# Patient Record
Sex: FEMALE | Race: WHITE | NOT HISPANIC OR LATINO
[De-identification: names, ages, dates, MRNs, and addresses within clinical notes are randomized per-mention and may not be internally consistent; named-entity substitution may affect disease eponyms.]

---

## 2017-04-01 PROBLEM — Z00.00 ENCOUNTER FOR PREVENTIVE HEALTH EXAMINATION: Status: ACTIVE | Noted: 2017-04-01

## 2019-10-18 ENCOUNTER — APPOINTMENT (OUTPATIENT)
Age: 44
End: 2019-10-18
Payer: COMMERCIAL

## 2019-10-18 PROCEDURE — 74177 CT ABD & PELVIS W/CONTRAST: CPT

## 2019-10-18 PROCEDURE — Q9967B: CUSTOM

## 2020-10-15 ENCOUNTER — APPOINTMENT (OUTPATIENT)
Dept: OBGYN | Facility: CLINIC | Age: 45
End: 2020-10-15

## 2020-11-11 ENCOUNTER — EMERGENCY (EMERGENCY)
Facility: HOSPITAL | Age: 45
LOS: 1 days | End: 2020-11-11
Admitting: EMERGENCY MEDICINE
Payer: COMMERCIAL

## 2020-11-11 PROCEDURE — 93010 ELECTROCARDIOGRAM REPORT: CPT

## 2020-11-11 PROCEDURE — 74177 CT ABD & PELVIS W/CONTRAST: CPT | Mod: 26

## 2020-11-11 PROCEDURE — 99285 EMERGENCY DEPT VISIT HI MDM: CPT

## 2020-11-11 PROCEDURE — 76705 ECHO EXAM OF ABDOMEN: CPT | Mod: 26

## 2022-03-15 ENCOUNTER — TRANSCRIPTION ENCOUNTER (OUTPATIENT)
Age: 47
End: 2022-03-15

## 2022-04-11 ENCOUNTER — TRANSCRIPTION ENCOUNTER (OUTPATIENT)
Age: 47
End: 2022-04-11

## 2022-04-14 ENCOUNTER — TRANSCRIPTION ENCOUNTER (OUTPATIENT)
Age: 47
End: 2022-04-14

## 2022-12-12 ENCOUNTER — APPOINTMENT (OUTPATIENT)
Dept: ORTHOPEDIC SURGERY | Facility: CLINIC | Age: 47
End: 2022-12-12

## 2022-12-12 DIAGNOSIS — D68.9 COAGULATION DEFECT, UNSPECIFIED: ICD-10-CM

## 2022-12-12 DIAGNOSIS — Z78.9 OTHER SPECIFIED HEALTH STATUS: ICD-10-CM

## 2022-12-12 PROCEDURE — 72100 X-RAY EXAM L-S SPINE 2/3 VWS: CPT

## 2022-12-12 PROCEDURE — 99214 OFFICE O/P EST MOD 30 MIN: CPT

## 2022-12-12 RX ORDER — PANCRELIPASE 36000; 180000; 114000 [USP'U]/1; [USP'U]/1; [USP'U]/1
36000-114000 CAPSULE, DELAYED RELEASE PELLETS ORAL
Qty: 240 | Refills: 0 | Status: ACTIVE | COMMUNITY
Start: 2022-11-29

## 2022-12-12 NOTE — HISTORY OF PRESENT ILLNESS
[de-identified] : Patient denies any injury, she reports pain and stiffness that starts in the back and radiates into BL upper thighs and the LLE to the foot. She attributes the pain to running, which she is able to perform.

## 2022-12-12 NOTE — PHYSICAL EXAM
[NL (90)] : forward flexion 90 degrees [NL (30)] : right lateral rotation 30 degrees [NL (45)] : extension 45 degrees [NL (40)] : right lateral bending 40 degrees [2___] : left dorsiflexors 2[unfilled]/5 [3___] : right dorsiflexors 3[unfilled]/5 [4___] : right extensor hallicus longus 4[unfilled]/5 [] : patient ambulates without assistive device [Straightening consistent with spasm] : Straightening consistent with spasm

## 2022-12-29 ENCOUNTER — FORM ENCOUNTER (OUTPATIENT)
Age: 47
End: 2022-12-29

## 2023-01-09 ENCOUNTER — APPOINTMENT (OUTPATIENT)
Dept: ORTHOPEDIC SURGERY | Facility: CLINIC | Age: 48
End: 2023-01-09
Payer: COMMERCIAL

## 2023-01-09 DIAGNOSIS — M51.27 OTHER INTERVERTEBRAL DISC DISPLACEMENT, LUMBOSACRAL REGION: ICD-10-CM

## 2023-01-09 PROCEDURE — 99214 OFFICE O/P EST MOD 30 MIN: CPT

## 2023-01-09 NOTE — DATA REVIEWED
[MRI] : MRI [Lumbar Spine] : lumbar spine [FreeTextEntry1] : L3-4 lateral disc hernation with LT L3 and RT L4 nerve root impression. L4-5 broad disc herniation with LT L5 nerve root impression

## 2023-01-09 NOTE — PHYSICAL EXAM
[NL (90)] : forward flexion 90 degrees [NL (30)] : right lateral rotation 30 degrees [NL (45)] : extension 45 degrees [NL (40)] : right lateral bending 40 degrees [2___] : left dorsiflexors 2[unfilled]/5 [3___] : right dorsiflexors 3[unfilled]/5 [4___] : right extensor hallicus longus 4[unfilled]/5 [Straightening consistent with spasm] : Straightening consistent with spasm [] : non-antalgic

## 2023-01-26 ENCOUNTER — APPOINTMENT (OUTPATIENT)
Dept: ORTHOPEDIC SURGERY | Facility: CLINIC | Age: 48
End: 2023-01-26
Payer: COMMERCIAL

## 2023-01-26 PROCEDURE — 99215 OFFICE O/P EST HI 40 MIN: CPT

## 2023-01-26 PROCEDURE — 73502 X-RAY EXAM HIP UNI 2-3 VIEWS: CPT

## 2023-01-26 RX ORDER — METHYLPREDNISOLONE 4 MG/1
4 TABLET ORAL
Qty: 1 | Refills: 0 | Status: ACTIVE | COMMUNITY
Start: 2023-01-26 | End: 1900-01-01

## 2023-01-26 NOTE — HISTORY OF PRESENT ILLNESS
[Lower back] : lower back [5] : 5 [8] : 8 [Shooting] : shooting [Constant] : constant [Leisure] : leisure [Sleep] : sleep [Physical therapy] : physical therapy [Sitting] : sitting [Lying in bed] : lying in bed [Full time] : Work status: full time [de-identified] : patient presents today with low back pain NKI. She states she has been running for a long time and started to feel back pain 3 months ago. She also c/o right hip pain. She saw Dr Tesfaye on 12/12/22 and got Xrays. She got an MRI of her lumbar spine at [facility] on 12/30/22. Dr Tesfaye told her she also has limited mobility in her left foot. She has been going to physical therapy since December with some relief. She feels numbness and tingling in both lower extremities, left more than right. 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

## 2023-01-26 NOTE — ASSESSMENT
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 \par \par Patient will begin Medrol.  Patient advised not to take any NSAIDs while taking the steroids. \par \par Patient given prescription for MRI, follow up after study is completed to discuss results. \par \par Continue PT \par \par Recommend: - NSAID - Heating pad - Muscle relaxer - Core strengthening exercise - Hamstring stretching exercise Patient is given back rehabilitation exercise book. \par \par Recommend: - rest - ice - compression - elevation \par \par

## 2023-01-26 NOTE — DATA REVIEWED
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

## 2023-01-26 NOTE — PHYSICAL EXAM
[Right] : right hip [5___] : adduction 5[unfilled]/5 [] : positive impingement maneuvers [2+] : posterior tibialis pulse: 2+ [de-identified] : external rotation 30 degrees [TWNoteComboBox6] : internal rotation 10 degrees

## 2023-01-29 ENCOUNTER — FORM ENCOUNTER (OUTPATIENT)
Age: 48
End: 2023-01-29

## 2023-01-30 ENCOUNTER — APPOINTMENT (OUTPATIENT)
Dept: MRI IMAGING | Facility: CLINIC | Age: 48
End: 2023-01-30
Payer: COMMERCIAL

## 2023-01-30 PROCEDURE — 73721 MRI JNT OF LWR EXTRE W/O DYE: CPT | Mod: RT

## 2023-02-06 ENCOUNTER — APPOINTMENT (OUTPATIENT)
Dept: ORTHOPEDIC SURGERY | Facility: CLINIC | Age: 48
End: 2023-02-06
Payer: COMMERCIAL

## 2023-02-06 DIAGNOSIS — M47.817 SPONDYLOSIS W/OUT MYELOPATHY OR RADICULOPATHY, LUMBOSACRAL REGION: ICD-10-CM

## 2023-02-06 DIAGNOSIS — M25.851 OTHER SPECIFIED JOINT DISORDERS, RIGHT HIP: ICD-10-CM

## 2023-02-06 DIAGNOSIS — M70.61 TROCHANTERIC BURSITIS, RIGHT HIP: ICD-10-CM

## 2023-02-06 DIAGNOSIS — M51.26 OTHER INTERVERTEBRAL DISC DISPLACEMENT, LUMBAR REGION: ICD-10-CM

## 2023-02-06 DIAGNOSIS — M54.16 RADICULOPATHY, LUMBAR REGION: ICD-10-CM

## 2023-02-06 DIAGNOSIS — M51.37 OTHER INTERVERTEBRAL DISC DEGENERATION, LUMBOSACRAL REGION: ICD-10-CM

## 2023-02-06 DIAGNOSIS — M51.36 OTHER INTERVERTEBRAL DISC DEGENERATION, LUMBAR REGION: ICD-10-CM

## 2023-02-06 DIAGNOSIS — M16.11 UNILATERAL PRIMARY OSTEOARTHRITIS, RIGHT HIP: ICD-10-CM

## 2023-02-06 DIAGNOSIS — M48.061 SPINAL STENOSIS, LUMBAR REGION WITHOUT NEUROGENIC CLAUDICATION: ICD-10-CM

## 2023-02-06 PROCEDURE — 99215 OFFICE O/P EST HI 40 MIN: CPT

## 2023-02-06 NOTE — HISTORY OF PRESENT ILLNESS
[Lower back] : lower back [5] : 5 [8] : 8 [Shooting] : shooting [Constant] : constant [Leisure] : leisure [Sleep] : sleep [Physical therapy] : physical therapy [Sitting] : sitting [Lying in bed] : lying in bed [Full time] : Work status: full time [de-identified] : Follow up right hip MRI Results at [facility]. Feels some improvement. Hasn't started PT yet. Doing HEP. Denies taking Medrol due to stomach issues. Denies pain medication.  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

## 2023-02-06 NOTE — PHYSICAL EXAM
[Right] : right hip [5___] : adduction 5[unfilled]/5 [2+] : posterior tibialis pulse: 2+ [] : no palpable masses [de-identified] : external rotation 30 degrees [TWNoteComboBox6] : internal rotation 10 degrees

## 2023-02-06 NOTE — DATA REVIEWED
[MRI] : MRI [Right] : of the right [Hip] : hip [Report was reviewed and noted in the chart] : The report was reviewed and noted in the chart [I independently reviewed and interpreted images and report] : I independently reviewed and interpreted images and report [FreeTextEntry1] : Mild DJD\par Trochanteric bursitis

## 2023-02-14 NOTE — REVIEW OF SYSTEMS
ID Progress Note      SUBJECTIVE:    Occasional SS pain.  Denies  fever, chills, or SOB. No new complaints      OBJECTIVE:  Tmax Temp (24hrs), Av.5 °F (36.9 °C), Min:97.9 °F (36.6 °C), Max:99.1 °F (37.3 °C)     Last Vitals   Vitals:    23 1133   BP: (!) 151/72   Pulse: 79   Resp: 18   Temp: 99 °F (37.2 °C)         Gen: Well developed, well nourished. Not in distress  HEENT: EOMI, JERICHO, No oral lesions  Neck:  Supple, No JVD, No bruits, No LAP.  CVS:  HR - RRR, S1, S2. No murmur  Lung: Clear to auscultation, no dullness to percussion.  Abd:  Soft, Non-tender. No organomegaly, No palpable masses, NABS  : Unremarkable  Extr:  No cyanosis, clubbing or edema. L knee SS dressing in place  Neuro: No focal deficits  Skin: No rashes.      MEDICATIONS;    As per MAR and reviewed      LABS CULTURES AND IMAGING: REVIEWED    Recent Labs     23  0612 23  0526   WBC 6.3  --    RBC 3.32*  --    HGB 10.2* 11.0*   HCT 32.0*  --    *  --          Microbiology Results  (Last 10 results in the past 7 days)    Specimen   Gram Smear   Culture Result   Status       23  0757         No organisms seen.  [P]            Present Polymorphonuclear cells.  [P]            Slide prepared by [employer].  [P]           23  1804         No organisms seen.            Present Polymorphonuclear cells.            Slide prepared by [employer].                 [P] - Preliminary Result              No results found.       ASSESSMENT:     # Doubt  septic arthritis of the left knee . The finding are most likely related  due to trauma and severe OA.  # S/P I&D  # S/P recent traumatic injury of the left knee  # Erosive changes of the left knee bones-rule out osteomyelitis versus due to severe DJD   # History of severe osteoarthritis  # History of right knee arthroplasty  # History of hypertension      Recommendation:     # Cultures remain negative   # Stable off  cefepime and vanco   # Cont. to monitor for s/s of infection  off abx.    Harrison Frias MD  2/14/2023     [Joint Pain] : joint pain [Joint Stiffness] : joint stiffness

## 2023-04-17 ENCOUNTER — APPOINTMENT (OUTPATIENT)
Dept: ORTHOPEDIC SURGERY | Facility: CLINIC | Age: 48
End: 2023-04-17

## 2024-03-25 ENCOUNTER — OFFICE (OUTPATIENT)
Dept: URBAN - METROPOLITAN AREA CLINIC 8 | Facility: CLINIC | Age: 49
Setting detail: OPHTHALMOLOGY
End: 2024-03-25
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
## 2024-03-25 PROCEDURE — NO SHOW FE NO SHOW FEE: Performed by: OPHTHALMOLOGY

## 2024-06-03 ENCOUNTER — APPOINTMENT (OUTPATIENT)
Dept: ORTHOPEDIC SURGERY | Facility: CLINIC | Age: 49
End: 2024-06-03
Payer: COMMERCIAL

## 2024-06-03 VITALS — WEIGHT: 155 LBS | HEIGHT: 60 IN | BODY MASS INDEX: 30.43 KG/M2

## 2024-06-03 PROCEDURE — 99214 OFFICE O/P EST MOD 30 MIN: CPT

## 2024-06-03 PROCEDURE — 73503 X-RAY EXAM HIP UNI 4/> VIEWS: CPT | Mod: LT

## 2024-06-03 RX ORDER — METHYLPREDNISOLONE 4 MG/1
4 TABLET ORAL
Qty: 1 | Refills: 0 | Status: ACTIVE | COMMUNITY
Start: 2024-06-03 | End: 1900-01-01

## 2024-06-03 NOTE — IMAGING
[Left] : left hip with pelvis [AP] : anteroposterior [Lateral] : lateral [There are no fractures, subluxations or dislocations. No significant abnormalities are seen] : There are no fractures, subluxations or dislocations. No significant abnormalities are seen

## 2024-06-03 NOTE — ASSESSMENT
[FreeTextEntry1] : 47 yo female presents today for eval of her left hip. XR shows no significant bony abnormalities. However, exam suggestive of impingement vs labral tear. Recommend MRI for further eval.   - Patient given prescription for MRI, follow up after study is completed to discuss results.   - Patient will begin Medrol.  Patient advised not to take any NSAIDs while taking the steroids.   - Recommend NSAIDs PRN  - Recommend rest, ice, compression, elevation - Recommend activity modification, avoid strenuous or high impact activities  Patient was educated on their diagnosis today. Emphasized the importance of gentle stretching and strengthening. Patient expressed understanding and all questions were answered today.    Follow up after MRI

## 2024-06-03 NOTE — PHYSICAL EXAM
[Right] : right hip [Left] : left hip [] : groin pain with external rotation [FreeTextEntry9] : I [TWNoteComboBox7] : flexion 75 degrees [de-identified] : external rotation 30 degrees [TWNoteComboBox6] : internal rotation 10 degrees

## 2024-06-03 NOTE — HISTORY OF PRESENT ILLNESS
[Sudden] : sudden [7] : 7 [Sharp] : sharp [Shooting] : shooting [Tightness] : tightness [Constant] : constant [Sleep] : sleep [Physical therapy] : physical therapy [Sitting] : sitting [Walking] : walking [Exercising] : exercising [Full time] : Work status: full time [de-identified] : Patient presents today with left hip pain since 4/2024 with NKI. Patient states she has severe pain radiating into the groin. Patient states she feels increased pain with walking and rising from a sitting to standing position. Patient states she feels stiff and tight. Patient admits to going to PT 2x a week with some relief. Patient admits to taking Ibuprofen PRN for pain with some relief. Patient denies recent imaging.  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

## 2024-06-24 ENCOUNTER — APPOINTMENT (OUTPATIENT)
Dept: ORTHOPEDIC SURGERY | Facility: CLINIC | Age: 49
End: 2024-06-24

## 2024-06-24 DIAGNOSIS — M25.852 OTHER SPECIFIED JOINT DISORDERS, LEFT HIP: ICD-10-CM

## 2024-06-24 PROCEDURE — 99213 OFFICE O/P EST LOW 20 MIN: CPT

## 2024-07-08 ENCOUNTER — APPOINTMENT (OUTPATIENT)
Dept: ORTHOPEDIC SURGERY | Facility: CLINIC | Age: 49
End: 2024-07-08

## 2024-08-19 ENCOUNTER — APPOINTMENT (OUTPATIENT)
Dept: ORTHOPEDIC SURGERY | Facility: CLINIC | Age: 49
End: 2024-08-19